# Patient Record
Sex: FEMALE | Race: WHITE | NOT HISPANIC OR LATINO | Employment: UNEMPLOYED | URBAN - NONMETROPOLITAN AREA
[De-identification: names, ages, dates, MRNs, and addresses within clinical notes are randomized per-mention and may not be internally consistent; named-entity substitution may affect disease eponyms.]

---

## 2022-10-07 ENCOUNTER — TELEPHONE (OUTPATIENT)
Dept: RADIATION ONCOLOGY | Facility: HOSPITAL | Age: 72
End: 2022-10-07

## 2022-10-07 NOTE — TELEPHONE ENCOUNTER
Message left for patient to schedule Radiation Therapy consult, number left for patient to return call.

## 2022-10-10 ENCOUNTER — TELEPHONE (OUTPATIENT)
Dept: RADIATION ONCOLOGY | Facility: HOSPITAL | Age: 72
End: 2022-10-10

## 2022-10-10 NOTE — TELEPHONE ENCOUNTER
"Patient returned phone call to set up consult appointment.  'Elham\", agreed to come for consult with, Dr. Pacheco: October 19th, Wednesday @ 1:30pm.     "

## 2022-10-18 RX ORDER — DEXAMETHASONE 4 MG/1
4 TABLET ORAL
COMMUNITY

## 2022-10-18 RX ORDER — LOSARTAN POTASSIUM 25 MG/1
1 TABLET ORAL DAILY
COMMUNITY
Start: 2022-03-30

## 2022-10-18 RX ORDER — NYSTATIN 10B UNIT
POWDER (EA) MISCELLANEOUS
COMMUNITY

## 2022-10-18 RX ORDER — METRONIDAZOLE 7.5 MG/G
GEL VAGINAL
COMMUNITY
Start: 2022-01-13

## 2022-10-18 RX ORDER — HYDROCODONE BITARTRATE AND ACETAMINOPHEN 10; 325 MG/1; MG/1
1-2 TABLET ORAL
COMMUNITY
Start: 2022-09-23

## 2022-10-18 RX ORDER — PROCHLORPERAZINE MALEATE 10 MG
10 TABLET ORAL EVERY 6 HOURS PRN
COMMUNITY

## 2022-10-18 RX ORDER — DOFETILIDE 0.25 MG/1
1 CAPSULE ORAL EVERY 12 HOURS
COMMUNITY
Start: 2022-09-06

## 2022-10-19 ENCOUNTER — ONCOLOGY VISIT (OUTPATIENT)
Dept: RADIATION ONCOLOGY | Facility: HOSPITAL | Age: 72
End: 2022-10-19
Attending: RADIOLOGY
Payer: COMMERCIAL

## 2022-10-19 VITALS
WEIGHT: 264.1 LBS | HEIGHT: 63 IN | DIASTOLIC BLOOD PRESSURE: 84 MMHG | BODY MASS INDEX: 46.79 KG/M2 | OXYGEN SATURATION: 97 % | SYSTOLIC BLOOD PRESSURE: 128 MMHG | HEART RATE: 65 BPM | TEMPERATURE: 98 F | RESPIRATION RATE: 24 BRPM

## 2022-10-19 DIAGNOSIS — Z17.0 MALIGNANT NEOPLASM OF LOWER-OUTER QUADRANT OF LEFT BREAST OF FEMALE, ESTROGEN RECEPTOR POSITIVE (H): Primary | ICD-10-CM

## 2022-10-19 DIAGNOSIS — C50.512 MALIGNANT NEOPLASM OF LOWER-OUTER QUADRANT OF LEFT BREAST OF FEMALE, ESTROGEN RECEPTOR POSITIVE (H): Primary | ICD-10-CM

## 2022-10-19 PROCEDURE — 99205 OFFICE O/P NEW HI 60 MIN: CPT | Performed by: RADIOLOGY

## 2022-10-19 PROCEDURE — G0463 HOSPITAL OUTPT CLINIC VISIT: HCPCS

## 2022-10-19 ASSESSMENT — ENCOUNTER SYMPTOMS
CONSTIPATION: 0
BACK PAIN: 1
VOMITING: 0
CHILLS: 0
NAUSEA: 0
DIARRHEA: 0
PALPITATIONS: 0
COUGH: 0
FEVER: 0
WEIGHT LOSS: 0
ABDOMINAL PAIN: 0
INSOMNIA: 0
SHORTNESS OF BREATH: 0

## 2022-10-19 ASSESSMENT — PATIENT HEALTH QUESTIONNAIRE - PHQ9: SUM OF ALL RESPONSES TO PHQ QUESTIONS 1-9: 0

## 2022-10-19 ASSESSMENT — PAIN SCALES - GENERAL: PAINLEVEL: NO PAIN (0)

## 2022-10-19 NOTE — PROGRESS NOTES
[facility]    RADIATION ONCOLOGY CONSULTATION      PRIMARY PHYSICIAN: Lakia Galindo    Diagnosis:  Malignant neoplasm of lower outer quadrant of left female breast.       Cancer Staging   Malignant neoplasm of lower-outer quadrant of left female breast (H)  Staging form: Breast, AJCC 8th Edition  - Pathologic stage from 10/19/2022: Stage IA (pT1c, pN0, cM0, G1, ER+, ND+, HER2-, Oncotype DX score: 26) - Signed by Celina Pacheco MD on 10/19/2022      IMPRESSION: 72 year old woman with screen detected left lower outer quadrant grade 1 IDC, ER/ND+, H2N- (US bx 8/18/2022).  9/8/2022: left breast lumpectomy/SLNB.  Pathology: 1.4 cm grade 1 IDC associated with non-extensive grade II DCIS, no LVI, margins negative: 8 mm IDC inferior, 4 mm DCIS superior, 0/5 SLN positive.  Oncotype DX: 26.      PLAN: Radiation therapy to the left breast to a total dose of 4256 cGy in 16 fractions of 266 cGy per fraction with a boost to the tumor bed to an additional 1000 cGy in 4 fractions of 250 cGy per fraction.  Radiation therapy will be delivered after the completion of chemotherapy with Docetaxel/Cytoxan every 3 weeks for 4 cycles.  Radiation would begin approximately 30 days from her last course of chemotherapy.  Consideration can also be given to APBI (3000 cGy in 5 fractions every other day) if the lumpectomy cavity was demarcated by surgical clips.    She will return for follow-up evaluation after completion of planned chemotherapy.    _________________________________________________________    HPI: I had the opportunity to see the patient in outpatient consultation to discuss the role of radiation therapy in the management of her left breast cancer.    The patient is a very pleasant 72-year-old female who underwent bilateral screening mammogram on 7/22/2022 that showed a new irregular shaped mass with calcifications measuring 5 x 8 mm at the 3 o'clock position of the left breast.    8/9/2022 left digital mammogram:  Lobular mass with dense calcifications noted at the 3 o'clock position 7 cm deep to the nipple    2022 left breast ultrasound: At the 4 o'clock position 7 cm from the nipple there is a 10 x 9 x 7 mm heterogenic mass area present    2022 ultrasound-guided left breast biopsy: Invasive ductal carcinoma, grade 1, ER positive (>91%), SD positive (2%), HER2 negative.      2022 left breast lumpectomy and sentinel node biopsy: Reveals 1.4 cm invasive ductal carcinoma, [place] grade 1, DCIS present, closest margin to invasive carcinoma is 0.8 cm, closest margin to DCIS is 0.4 cm.  Negative for lymphovascular invasion.  0/5 sentinel lymph nodes negative for carcinoma. Oncotype recurrence score is 26.     10/17/2022 Medical oncology visit: Discussion involved Oncotype score of 26 being an intermediate risk, plan is for Docetaxel and Cytoxan every 3 weeks for 4 cycles with Neulasta.     Pacemaker: no  History of Radiation Therapy: no  History of autoimmune disorders: no       Past Medical History:   Diagnosis Date     Abdominal hernia      Bradycardia 10/23/2013     Breast cancer, stage 1, estrogen receptor positive, left (H) 2022     Chronic bilateral low back pain without sciatica 2016     Chronic, continuous use of opioids 2018     Endometrial cancer (H)      Endometrial cancer (H)      Hypertension      Muscle weakness 2022     Osteoarthritis of knees, bilateral 2010     Persistent atrial fibrillation (H) 2019     Pre-diabetes 2020     Rosacea      Stiffness of joint, shoulder region, left 2022       Past Surgical History:   Procedure Laterality Date     APPENDECTOMY  1967     BIOPSY OF BREAST, NEEDLE CORE      ,      Breast reduction surgery Bilateral       SECTION  1988     GASTRIC BYPASS  1986    Cholecystectomy     HERNIA REPAIR  1990     HERNIA REPAIR  2008    Mesh     HERNIA REPAIR  2009     HERNIA  REPAIR, UMBILICAL  01/01/1983     HERNIORRHAPHY VENTRAL  02/01/2013    Procedure: HERNIORRHAPHY VENTRAL;;  Surgeon: Nick Perez MD;  Location: [facility] OR     HYSTERECTOMY TOTAL ABDOMINAL, BILATERAL SALPINGO-OOPHORECTOMY, NODE DISSECTION, COMBINED  02/01/2013    Procedure: COMBINED HYSTERECTOMY TOTAL ABDOMINAL, SALPINGO-OOPHORECTOMY, NODE DISSECTION;  Open Total Abdominal Hysterectomy, Bilateral Salpingo-Oophorectomy, Pelvic Washings, Extensive Lysis of Adhesions, Repair of Recurrent Incisional Hernia Anesthesia general with block;  Surgeon: Matthew Rios MD;  Location: [facility] OR     JOINT REPLACEMENT  01/01/2010     JOINT REPLACEMENT  01/01/2010     LUMPECTOMY BREAST Left 09/08/2022    Dr. Go     SURGICAL PATHOLOGY EXAM Left 09/08/2022    Left [place] Lymph Node Biopsy     TONSILLECTOMY  01/01/1958       Family History   Problem Relation Age of Onset     Dementia Mother        Social History     Tobacco Use     Smoking status: Never     Smokeless tobacco: Never   Substance Use Topics     Alcohol use: No       CURRENT MEDICATIONS:   Current Outpatient Medications   Medication     apixaban ANTICOAGULANT (ELIQUIS) 5 MG tablet     ascorbic acid (VITAMIN C) 1000 MG TABS     B COMPLEX-ZINC PO     CALCIUM CITRATE PO     Cholecalciferol (VITAMIN D3)     CRANBERRY PO     dofetilide (TIKOSYN) 250 MCG capsule     HYDROcodone-acetaminophen (NORCO)  MG per tablet     losartan (COZAAR) 25 MG tablet     Magnesium 400 MG CAPS     Multiple Vitamin (DAILY MULTIVITAMIN PO)     nystatin POWD     prochlorperazine (COMPAZINE) 10 MG tablet     atenolol-chlorthalidone (TENORETIC) 100-25 MG per tablet     dexamethasone (DECADRON) 4 MG tablet     ferrous sulfate 325 (65 FE) MG tablet     Flaxseed, Linseed, 1000 MG CAPS     metroNIDAZOLE (METROGEL) 0.75 % vaginal gel     senna-docusate (SENOKOT-S;PERICOLACE) 8.6-50 MG per tablet     sulfamethoxazole-trimethoprim (BACTRIM DS,SEPTRA DS) 800-160 MG per tablet     zolpidem (AMBIEN) 10  "MG tablet     No current facility-administered medications for this visit.         ALLERGIES:  Allergies   Allergen Reactions     Codeine Sulfate      Contrast Dye      Diagnostic X-Ray Materials Itching and Other (See Comments)     Itching in mouth     Dilaudid Cough      Dilaudid - small amounts ok otherwise stopped breathing       No Clinical Screening - See Comments Other (See Comments)     Morphine and Related: Blood pressure elevates     Vancomycin      Noted on H&P       Review of Systems   Constitutional: Negative for chills, fever, malaise/fatigue and weight loss.   Respiratory: Negative for cough and shortness of breath.    Cardiovascular: Negative for chest pain and palpitations.   Gastrointestinal: Negative for abdominal pain, constipation, diarrhea, nausea and vomiting.   Musculoskeletal: Positive for back pain (chronic).   Psychiatric/Behavioral: The patient does not have insomnia.        VITAL SIGNS:  /84 (BP Location: Left arm, Patient Position: Chair, Cuff Size: Adult Regular)   Pulse 65   Temp 98  F (36.7  C) (Tympanic)   Resp 24   Ht 1.6 m (5' 3\")   Wt 119.8 kg (264 lb 1.6 oz)   SpO2 97%   BMI 46.78 kg/m    Wt Readings from Last 4 Encounters:   10/19/22 119.8 kg (264 lb 1.6 oz)   03/22/13 109 kg (240 lb 3.2 oz)   03/09/13 111.6 kg (246 lb 1.6 oz)   03/08/13 113.4 kg (250 lb)       PHYSICAL EXAM:  Constitutional: Alert pleasant female in no distress.  Breasts: bilaterally large and pendulous.  Right breast is unremarkable.  Left breast: healed upper outer circumareolar incision.  Lumpectomy site palpable at 3:00.  Puckering of the skin over the lumpectomy site/seroma.  No erythema or edema of the breast.  No nipple discharge.  Nodes:  No palpable adenopathy left axilla/supraclavicular  Skin:  without unusual rashes or lesions in the proposed treatment area.  Musculoskeletal: Range of motion LUE excellent: no edema  Psychiatric: Oriented to time place and person with recent and remote " memory intact and with normal affect.  Judgment and insight are sufficient.    PILY Choe CNP    Celina Pacheco MD    Time spent in interaction with the patient, review of medical records, documentation, care coordination, review of imaging and physical examination: 75 minutes    Note to patient: Medical notes are made available to patients in the interest of transparency.  Be advised that this is a medical document and intended as a peer to peer communication.  It is written using medical language and may contain abbreviations or verbiage that is unfamiliar.  It may appear blunt or direct.  Medical documentation is intended to carry relevant information, fax is evident in the clinical opinion of the practitioner.  Further clarification can be obtained by scheduling a follow-up evaluation with the practitioner.

## 2022-10-19 NOTE — PROGRESS NOTES
"Radiation Oncology Rooming Note    October 19, 2022 2:17 PM   Virgen Corrales is a 72 year old female who presents for:    Chief Complaint   Patient presents with     Consult     Radiation Oncology Consultation     Initial Vitals: /84 (BP Location: Left arm, Patient Position: Chair, Cuff Size: Adult Regular)   Pulse 65   Temp 98  F (36.7  C) (Tympanic)   Resp 24   Ht 1.6 m (5' 3\")   Wt 119.8 kg (264 lb 1.6 oz)   SpO2 97%   BMI 46.78 kg/m   Estimated body mass index is 46.78 kg/m  as calculated from the following:    Height as of this encounter: 1.6 m (5' 3\").    Weight as of this encounter: 119.8 kg (264 lb 1.6 oz). Body surface area is 2.31 meters squared.  No Pain (0) Comment: Data Unavailable   No LMP recorded. Patient has had a hysterectomy.  Allergies reviewed: Yes  Medications reviewed: Yes      Patient was assessed using the NCCN psychosocial distress thermometer. Patient rated the score as a zero. Patient rated current stressors as no stressors. Stressors will be brought to the attention of provider or Oncology RN Care Coordinator for a score of 6 or greater or per nurses discretion.     Patient received folder containing bill of rights, advance directives information/application, radiation therapy site specific informational pamphlet, radiation therapy web sites for patient research hand out, financial letter, vaccines during cancer treatment hand out, mental health services and providers packet, radiation therapy business card,  business card, skin care during radiation hand out, starting radiation handout, care partners,  and [employer] application.      Sherry Covington LPN    "

## 2023-01-19 ENCOUNTER — TELEPHONE (OUTPATIENT)
Dept: RADIATION ONCOLOGY | Facility: HOSPITAL | Age: 73
End: 2023-01-19
Payer: COMMERCIAL

## 2023-01-19 NOTE — TELEPHONE ENCOUNTER
Left message with Madison at [facility] to have Dr. De Luna's nurse call us back to let us know if Elham can start XRT.  Final cycle of chemotherapy was completed on 1/4/23.  Awaiting call back.    1400- received a call back from  reporting that the patient can start XRT.  Patient will be scheduled for a simulation.     April Morataya RN

## 2023-01-30 NOTE — PROGRESS NOTES
Oncology Follow-up Visit:  January 30, 2023    Diagnosis:   Malignant neoplasm of lower-outer quadrant of left breast of female, estrogen receptor positive (H)    History Of Present Illness:  Ms. Corrales is a 72 year old female is here for follow-up post chemotherapy (completed 1/4/2023), here for routine follow up for consent and simulation. ***    Review Of Systems:  There were no vitals taken for this visit.    Past medical, social, surgical, and family histories reviewed.    Allergies:  Allergies as of 02/02/2023 - Reviewed 10/19/2022   Allergen Reaction Noted     Codeine sulfate  01/16/2013     Contrast dye  01/16/2013     Diagnostic x-ray materials Itching and Other (See Comments) 05/28/2009     Dilaudid cough  01/16/2013     No clinical screening - see comments Other (See Comments) 04/23/2008     Vancomycin  01/29/2013       Current Medications:  Current Outpatient Medications   Medication Sig Dispense Refill     apixaban ANTICOAGULANT (ELIQUIS) 5 MG tablet Take 1 tablet by mouth 2 times daily       ascorbic acid (VITAMIN C) 1000 MG TABS Take 1 tablet by mouth daily.       atenolol-chlorthalidone (TENORETIC) 100-25 MG per tablet Take 1 tablet by mouth daily. (Patient not taking: Reported on 10/19/2022)       B COMPLEX-ZINC PO Take 1 tablet by mouth daily.       CALCIUM CITRATE PO Take 250 mg by mouth 2 times daily       Cholecalciferol (VITAMIN D3) 1,000 Units daily       CRANBERRY PO        dexamethasone (DECADRON) 4 MG tablet Take 4 mg by mouth Take 8 mg(2 tabs) twice daily for 3 days beginning the day prior to chemotherapy for each cycle. (Patient not taking: Reported on 10/19/2022)       dofetilide (TIKOSYN) 250 MCG capsule Take 1 capsule by mouth every 12 hours       ferrous sulfate 325 (65 FE) MG tablet Take 325 mg by mouth daily (with breakfast). (Patient not taking: Reported on 10/19/2022)       Flaxseed, Linseed, 1000 MG CAPS 1 capsule daily. (Patient not taking: Reported on 10/19/2022)        "HYDROcodone-acetaminophen (NORCO)  MG per tablet Take 1-2 tablets by mouth Taking 1/2 tab every 6 hours. Takes for back pain. Also wears binders.       losartan (COZAAR) 25 MG tablet Take 1 tablet by mouth daily       Magnesium 400 MG CAPS Take 400 mg by mouth       metroNIDAZOLE (METROGEL) 0.75 % vaginal gel Apply to affected area 2 times daily as directed (Patient not taking: Reported on 10/19/2022)       Multiple Vitamin (DAILY MULTIVITAMIN PO) Take 1 tablet by mouth daily.       nystatin POWD As needed       prochlorperazine (COMPAZINE) 10 MG tablet Take 10 mg by mouth every 6 hours as needed for nausea or vomiting       senna-docusate (SENOKOT-S;PERICOLACE) 8.6-50 MG per tablet Take 1-2 tablets by mouth 2 times daily. (Patient not taking: Reported on 10/19/2022) 60 tablet 0     sulfamethoxazole-trimethoprim (BACTRIM DS,SEPTRA DS) 800-160 MG per tablet Take 1 tablet by mouth 2 times daily. (Patient not taking: Reported on 10/19/2022) 14 tablet 0     zolpidem (AMBIEN) 10 MG tablet Take 1 tablet by mouth daily. (Patient not taking: Reported on 10/19/2022)          Physical Exam:  There were no vitals taken for this visit.  {EXAM COMPLETE FEMALE:320997::\"  GENERAL APPEARANCE: healthy, alert and no distress\",\"   HENT: Mouth without ulcers or lesions\",\"   NECK: no adenopathy, no asymmetry or masses\",\"   LYMPHATICS: No cervical, supraclavicular, axillary or inguinal lymphadenopathy\",\"   RESP: lungs clear to auscultation - no rales, rhonchi or wheezes\",\"   CARDIOVASCULAR: regular rates and rhythm, normal S1 S2, no S3 or S4 and no murmur.\",\"   ABDOMEN:  soft, nontender, no HSM or masses and bowel sounds normal\",\"   MUSCULOSKELETAL: extremities normal- no gross deformities noted, no evidence of inflammation in joints, FROM in all extremities. No edema b/l LE.\",\"   SKIN: no suspicious lesions or rashes\",\"   PSYCHIATRIC: mentation appears normal and affect normal\"}    Laboratory/Imaging Studies  No visits with " results within 2 Week(s) from this visit.   Latest known visit with results is:   Office Visit on 01/31/2013   Component Date Value Ref Range Status     INR 01/31/2013 1.04  0.86 - 1.14 Final     ABO 01/31/2013 O   Final     RH(D) 01/31/2013  Pos   Final     Antibody Screen 01/31/2013 Neg   Final     Specimen Expires 01/31/2013 02/03/2013   Final        ASSESSMENT/PLAN:

## 2023-02-02 ENCOUNTER — ONCOLOGY VISIT (OUTPATIENT)
Dept: RADIATION ONCOLOGY | Facility: HOSPITAL | Age: 73
End: 2023-02-02
Attending: FAMILY MEDICINE
Payer: COMMERCIAL

## 2023-02-02 ENCOUNTER — ALLIED HEALTH/NURSE VISIT (OUTPATIENT)
Dept: RADIATION ONCOLOGY | Facility: HOSPITAL | Age: 73
End: 2023-02-02
Payer: COMMERCIAL

## 2023-02-02 VITALS
OXYGEN SATURATION: 99 % | TEMPERATURE: 98.2 F | SYSTOLIC BLOOD PRESSURE: 124 MMHG | HEART RATE: 87 BPM | RESPIRATION RATE: 21 BRPM | DIASTOLIC BLOOD PRESSURE: 84 MMHG

## 2023-02-02 DIAGNOSIS — C50.512 MALIGNANT NEOPLASM OF LOWER-OUTER QUADRANT OF LEFT BREAST OF FEMALE, ESTROGEN RECEPTOR POSITIVE (H): Primary | ICD-10-CM

## 2023-02-02 DIAGNOSIS — Z17.0 MALIGNANT NEOPLASM OF LOWER-OUTER QUADRANT OF LEFT BREAST OF FEMALE, ESTROGEN RECEPTOR POSITIVE (H): Primary | ICD-10-CM

## 2023-02-02 PROCEDURE — 77290 THER RAD SIMULAJ FIELD CPLX: CPT | Mod: 26 | Performed by: RADIOLOGY

## 2023-02-02 PROCEDURE — 77290 THER RAD SIMULAJ FIELD CPLX: CPT | Performed by: RADIOLOGY

## 2023-02-02 ASSESSMENT — PAIN SCALES - GENERAL: PAINLEVEL: NO PAIN (0)

## 2023-02-02 NOTE — PROGRESS NOTES
[facility]  DEPARTMENT OF RADIATION ONCOLOGY   SIMULATION NOTE    Name: Virgen Corrales MRN: [id number]   : 1950 (72 year old)  Date of Service: 2023        Diagnosis and Cancer Staging   Malignant neoplasm of lower-outer quadrant of left female breast (H)  Staging form: Breast, AJCC 8th Edition  - Pathologic stage from 10/19/2022: Stage IA (pT1c, pN0, cM0, G1, ER+, OR+, HER2-, Oncotype DX score: 26) - Signed by Celina Pacheco MD on 10/19/2022       Procedure   For non-SBRT treatment, the patient comes to clinic for simulation and radiation therapy for treatment as specified on the written consent (site of treatment, type of cancer). Therapy, Treatment Planning, and I reviewed the anticipated radiation therapy, clinical history and documentation, and radiographic information and images. We obtained written consent for treatment. With the patient, we verified their identification, site, and side of treatment. We evaluated multiple setup positions and elected to simulate the patient in a modified supine position. We used orthogonal lasers to align them with the CT simulator. We immobilized the patient with a customized torso mold and accessories to improve the reproducibility and safety for daily radiation therapy. We placed radiopaque markers to assist in identifying topographical landmarks for simulation. We obtained  and axial CT imaging through the target region. We used virtual simulation techniques to verify the adequacy of the CT images and to create a preliminary setup isocenter. Motion management of respiratory and cardiac motion utilized 4D-CT and/or deep inspiration breath hold (DIBH) through the anticipated field of radiation therapy. We placed tattoos to vahe the setup isocenter. The patient tolerated the procedure well and without complications. We will use available diagnostic and radiation therapy imaging studies for CT-based treatment planning with image fusion  as indicated. I anticipate utilizing a form of intensity-modulated or 3D-conformal radiation therapy to develop a computerized treatment plan whose dosimetric analysis (e.g., dose-volume histogram (DVH)) indicates adequate coverage of target tissues and sparing of nearby normal structures. We will complete routine QA procedures. The patient wished to proceed as recommended. We answered all questions to her satisfaction.    Implanted Cardiac Devices: None.    PILY Toledo CNP   Department of Radiation Oncology  Tel: [phone]  Fax: [phone]

## 2023-02-02 NOTE — PROGRESS NOTES
Patient was simulated today.    Ko CHING(RESHMA.)  Department of Radiation Oncology  Phone: [phone]

## 2023-02-17 ENCOUNTER — TELEPHONE (OUTPATIENT)
Dept: RADIATION ONCOLOGY | Facility: HOSPITAL | Age: 73
End: 2023-02-17

## 2023-02-21 PROCEDURE — 77295 3-D RADIOTHERAPY PLAN: CPT | Mod: 26 | Performed by: STUDENT IN AN ORGANIZED HEALTH CARE EDUCATION/TRAINING PROGRAM

## 2023-02-21 PROCEDURE — 77295 3-D RADIOTHERAPY PLAN: CPT | Performed by: STUDENT IN AN ORGANIZED HEALTH CARE EDUCATION/TRAINING PROGRAM

## 2023-02-21 PROCEDURE — 77300 RADIATION THERAPY DOSE PLAN: CPT | Mod: 26 | Performed by: STUDENT IN AN ORGANIZED HEALTH CARE EDUCATION/TRAINING PROGRAM

## 2023-02-21 PROCEDURE — 77334 RADIATION TREATMENT AID(S): CPT | Performed by: RADIOLOGY

## 2023-02-21 PROCEDURE — 77334 RADIATION TREATMENT AID(S): CPT | Mod: 26 | Performed by: STUDENT IN AN ORGANIZED HEALTH CARE EDUCATION/TRAINING PROGRAM

## 2023-02-21 PROCEDURE — 77300 RADIATION THERAPY DOSE PLAN: CPT | Performed by: RADIOLOGY

## 2023-02-27 ENCOUNTER — RESULTS ONLY (OUTPATIENT)
Dept: RADIATION ONCOLOGY | Facility: HOSPITAL | Age: 73
End: 2023-02-27

## 2023-02-27 ENCOUNTER — APPOINTMENT (OUTPATIENT)
Dept: RADIATION ONCOLOGY | Facility: HOSPITAL | Age: 73
End: 2023-02-27
Payer: COMMERCIAL

## 2023-02-27 LAB
RAD ONC ARIA COURSE ID: NORMAL
RAD ONC ARIA COURSE LAST TREATMENT DATE: NORMAL
RAD ONC ARIA COURSE START DATE: NORMAL
RAD ONC ARIA COURSE TREATMENT ELAPSED DAYS: 0
RAD ONC ARIA FIRST TREATMENT DATE: NORMAL
RAD ONC ARIA PLAN FRACTIONS TREATED TO DATE: 1
RAD ONC ARIA PLAN ID: NORMAL
RAD ONC ARIA PLAN PRESCRIBED DOSE PER FRACTION: 6 GY
RAD ONC ARIA PLAN TOTAL FRACTIONS PRESCRIBED: 5
RAD ONC ARIA PLAN TOTAL PRESCRIBED DOSE: 3000 CGY
RAD ONC ARIA REFERENCE POINT DOSAGE GIVEN TO DATE: NORMAL GY
RAD ONC ARIA REFERENCE POINT DOSAGE GIVEN TO DATE: NORMAL GY
RAD ONC ARIA REFERENCE POINT ID: NORMAL
RAD ONC ARIA REFERENCE POINT ID: NORMAL

## 2023-02-27 PROCEDURE — 77387 GUIDANCE FOR RADJ TX DLVR: CPT

## 2023-02-27 PROCEDURE — 77014 PR CT GUIDE FOR PLACEMENT RADIATION THERAPY FIELDS: CPT | Mod: 26 | Performed by: RADIOLOGY

## 2023-02-27 PROCEDURE — 77412 RADIATION TX DELIVERY LVL 3: CPT | Performed by: RADIOLOGY

## 2023-02-27 PROCEDURE — 77014 HC CT GUIDE FOR PLACEMENT RADIATION THERAPY FIELDS: CPT | Performed by: RADIOLOGY

## 2023-02-28 ENCOUNTER — APPOINTMENT (OUTPATIENT)
Dept: RADIATION ONCOLOGY | Facility: HOSPITAL | Age: 73
End: 2023-02-28
Payer: COMMERCIAL

## 2023-02-28 ENCOUNTER — RESULTS ONLY (OUTPATIENT)
Dept: RADIATION ONCOLOGY | Facility: HOSPITAL | Age: 73
End: 2023-02-28

## 2023-02-28 LAB
RAD ONC ARIA COURSE ID: NORMAL
RAD ONC ARIA COURSE LAST TREATMENT DATE: NORMAL
RAD ONC ARIA COURSE START DATE: NORMAL
RAD ONC ARIA COURSE TREATMENT ELAPSED DAYS: 1
RAD ONC ARIA FIRST TREATMENT DATE: NORMAL
RAD ONC ARIA PLAN FRACTIONS TREATED TO DATE: 2
RAD ONC ARIA PLAN ID: NORMAL
RAD ONC ARIA PLAN PRESCRIBED DOSE PER FRACTION: 6 GY
RAD ONC ARIA PLAN TOTAL FRACTIONS PRESCRIBED: 5
RAD ONC ARIA PLAN TOTAL PRESCRIBED DOSE: 3000 CGY
RAD ONC ARIA REFERENCE POINT DOSAGE GIVEN TO DATE: NORMAL GY
RAD ONC ARIA REFERENCE POINT DOSAGE GIVEN TO DATE: NORMAL GY
RAD ONC ARIA REFERENCE POINT ID: NORMAL
RAD ONC ARIA REFERENCE POINT ID: NORMAL

## 2023-02-28 PROCEDURE — 77014 HC CT GUIDE FOR PLACEMENT RADIATION THERAPY FIELDS: CPT | Performed by: RADIOLOGY

## 2023-02-28 PROCEDURE — 77387 GUIDANCE FOR RADJ TX DLVR: CPT

## 2023-02-28 PROCEDURE — 77014 PR CT GUIDE FOR PLACEMENT RADIATION THERAPY FIELDS: CPT | Mod: 26 | Performed by: RADIOLOGY

## 2023-02-28 PROCEDURE — 77412 RADIATION TX DELIVERY LVL 3: CPT | Performed by: RADIOLOGY

## 2023-03-01 ENCOUNTER — APPOINTMENT (OUTPATIENT)
Dept: RADIATION ONCOLOGY | Facility: HOSPITAL | Age: 73
End: 2023-03-01
Payer: COMMERCIAL

## 2023-03-01 ENCOUNTER — RESULTS ONLY (OUTPATIENT)
Dept: RADIATION ONCOLOGY | Facility: HOSPITAL | Age: 73
End: 2023-03-01

## 2023-03-01 LAB
RAD ONC ARIA COURSE ID: NORMAL
RAD ONC ARIA COURSE LAST TREATMENT DATE: NORMAL
RAD ONC ARIA COURSE START DATE: NORMAL
RAD ONC ARIA COURSE TREATMENT ELAPSED DAYS: 2
RAD ONC ARIA FIRST TREATMENT DATE: NORMAL
RAD ONC ARIA PLAN FRACTIONS TREATED TO DATE: 3
RAD ONC ARIA PLAN ID: NORMAL
RAD ONC ARIA PLAN PRESCRIBED DOSE PER FRACTION: 6 GY
RAD ONC ARIA PLAN TOTAL FRACTIONS PRESCRIBED: 5
RAD ONC ARIA PLAN TOTAL PRESCRIBED DOSE: 3000 CGY
RAD ONC ARIA REFERENCE POINT DOSAGE GIVEN TO DATE: NORMAL GY
RAD ONC ARIA REFERENCE POINT DOSAGE GIVEN TO DATE: NORMAL GY
RAD ONC ARIA REFERENCE POINT ID: NORMAL
RAD ONC ARIA REFERENCE POINT ID: NORMAL

## 2023-03-01 PROCEDURE — 77412 RADIATION TX DELIVERY LVL 3: CPT | Performed by: RADIOLOGY

## 2023-03-01 PROCEDURE — 77014 PR CT GUIDE FOR PLACEMENT RADIATION THERAPY FIELDS: CPT | Mod: 26 | Performed by: RADIOLOGY

## 2023-03-01 PROCEDURE — 77387 GUIDANCE FOR RADJ TX DLVR: CPT

## 2023-03-01 PROCEDURE — 77014 HC CT GUIDE FOR PLACEMENT RADIATION THERAPY FIELDS: CPT | Performed by: RADIOLOGY

## 2023-03-02 ENCOUNTER — APPOINTMENT (OUTPATIENT)
Dept: RADIATION ONCOLOGY | Facility: HOSPITAL | Age: 73
End: 2023-03-02
Payer: COMMERCIAL

## 2023-03-02 ENCOUNTER — RESULTS ONLY (OUTPATIENT)
Dept: RADIATION ONCOLOGY | Facility: HOSPITAL | Age: 73
End: 2023-03-02

## 2023-03-02 LAB
RAD ONC ARIA COURSE ID: NORMAL
RAD ONC ARIA COURSE LAST TREATMENT DATE: NORMAL
RAD ONC ARIA COURSE START DATE: NORMAL
RAD ONC ARIA COURSE TREATMENT ELAPSED DAYS: 3
RAD ONC ARIA FIRST TREATMENT DATE: NORMAL
RAD ONC ARIA PLAN FRACTIONS TREATED TO DATE: 4
RAD ONC ARIA PLAN ID: NORMAL
RAD ONC ARIA PLAN PRESCRIBED DOSE PER FRACTION: 6 GY
RAD ONC ARIA PLAN TOTAL FRACTIONS PRESCRIBED: 5
RAD ONC ARIA PLAN TOTAL PRESCRIBED DOSE: 3000 CGY
RAD ONC ARIA REFERENCE POINT DOSAGE GIVEN TO DATE: NORMAL GY
RAD ONC ARIA REFERENCE POINT DOSAGE GIVEN TO DATE: NORMAL GY
RAD ONC ARIA REFERENCE POINT ID: NORMAL
RAD ONC ARIA REFERENCE POINT ID: NORMAL

## 2023-03-02 PROCEDURE — 77014 HC CT GUIDE FOR PLACEMENT RADIATION THERAPY FIELDS: CPT | Performed by: RADIOLOGY

## 2023-03-02 PROCEDURE — 77014 PR CT GUIDE FOR PLACEMENT RADIATION THERAPY FIELDS: CPT | Mod: 26 | Performed by: RADIOLOGY

## 2023-03-02 PROCEDURE — 77412 RADIATION TX DELIVERY LVL 3: CPT | Performed by: RADIOLOGY

## 2023-03-02 PROCEDURE — 77387 GUIDANCE FOR RADJ TX DLVR: CPT

## 2023-03-03 ENCOUNTER — RESULTS ONLY (OUTPATIENT)
Dept: RADIATION ONCOLOGY | Facility: HOSPITAL | Age: 73
End: 2023-03-03

## 2023-03-03 ENCOUNTER — ALLIED HEALTH/NURSE VISIT (OUTPATIENT)
Dept: RADIATION ONCOLOGY | Facility: HOSPITAL | Age: 73
End: 2023-03-03

## 2023-03-03 ENCOUNTER — OFFICE VISIT (OUTPATIENT)
Dept: RADIATION ONCOLOGY | Facility: HOSPITAL | Age: 73
End: 2023-03-03
Payer: COMMERCIAL

## 2023-03-03 VITALS
RESPIRATION RATE: 16 BRPM | HEART RATE: 62 BPM | WEIGHT: 258.5 LBS | OXYGEN SATURATION: 97 % | SYSTOLIC BLOOD PRESSURE: 124 MMHG | TEMPERATURE: 97.5 F | DIASTOLIC BLOOD PRESSURE: 62 MMHG | BODY MASS INDEX: 45.79 KG/M2

## 2023-03-03 DIAGNOSIS — C50.512 MALIGNANT NEOPLASM OF LOWER-OUTER QUADRANT OF LEFT BREAST OF FEMALE, ESTROGEN RECEPTOR POSITIVE (H): Primary | ICD-10-CM

## 2023-03-03 DIAGNOSIS — Z17.0 MALIGNANT NEOPLASM OF LOWER-OUTER QUADRANT OF LEFT BREAST OF FEMALE, ESTROGEN RECEPTOR POSITIVE (H): Primary | ICD-10-CM

## 2023-03-03 LAB
RAD ONC ARIA COURSE ID: NORMAL
RAD ONC ARIA COURSE LAST TREATMENT DATE: NORMAL
RAD ONC ARIA COURSE START DATE: NORMAL
RAD ONC ARIA COURSE TREATMENT ELAPSED DAYS: 4
RAD ONC ARIA FIRST TREATMENT DATE: NORMAL
RAD ONC ARIA PLAN FRACTIONS TREATED TO DATE: 5
RAD ONC ARIA PLAN ID: NORMAL
RAD ONC ARIA PLAN PRESCRIBED DOSE PER FRACTION: 6 GY
RAD ONC ARIA PLAN TOTAL FRACTIONS PRESCRIBED: 5
RAD ONC ARIA PLAN TOTAL PRESCRIBED DOSE: 3000 CGY
RAD ONC ARIA REFERENCE POINT DOSAGE GIVEN TO DATE: NORMAL GY
RAD ONC ARIA REFERENCE POINT DOSAGE GIVEN TO DATE: NORMAL GY
RAD ONC ARIA REFERENCE POINT ID: NORMAL
RAD ONC ARIA REFERENCE POINT ID: NORMAL

## 2023-03-03 PROCEDURE — 77412 RADIATION TX DELIVERY LVL 3: CPT | Performed by: RADIOLOGY

## 2023-03-03 PROCEDURE — 77336 RADIATION PHYSICS CONSULT: CPT | Performed by: RADIOLOGY

## 2023-03-03 PROCEDURE — 77014 PR CT GUIDE FOR PLACEMENT RADIATION THERAPY FIELDS: CPT | Mod: 26 | Performed by: RADIOLOGY

## 2023-03-03 PROCEDURE — 77014 HC CT GUIDE FOR PLACEMENT RADIATION THERAPY FIELDS: CPT | Performed by: RADIOLOGY

## 2023-03-03 PROCEDURE — 77427 RADIATION TX MANAGEMENT X5: CPT | Performed by: RADIOLOGY

## 2023-03-03 ASSESSMENT — PAIN SCALES - GENERAL: PAINLEVEL: NO PAIN (0)

## 2023-03-03 NOTE — PROGRESS NOTES
Virgen Corrales  Gender: female  : 1950  Medical Record: [id number]  Primary Care Provider: Lakia Galindo    REFERRING PHYSICIANS: Dr. Julisa Meyer Dr. Friday     DIAGNOSIS: Malignant neoplasm of lower-outer quadrant of left breast of female, estrogen receptor positive (H)    TREATMENT INTENT: Curative   AREA TREATED: Left breast   PRIMARY TREATMENT TECHNIQUE: 3d conformal  ENERGY: 06X, 10X  TUMOR DOSE: 3000 cGy  NUMBER OF TREATMENTS: 5    BOOST AREA TREATED: N/A      TOTAL NUMBER OF TREATMENTS: 5  TOTAL DOSE: 3000 cGy  ELAPSED CALENDAR DAYS: 4  COMPLETION DATE: 2023       Teresa Allen DNP, APRN, FNP-C   Department of Radiation Oncology

## 2023-03-03 NOTE — PATIENT INSTRUCTIONS
Discharge instructions for radiation of the breast      Continue with skin care is using CeraVe to the treatment area for at least 3 weeks.  Skin reaction will peak at 7 to 10 days after last treatment and skin in the treatment area will burn more quickly than your other skin.  Protect skin from sun using a sunscreen, reapply frequently, and keep covered during peak times in the sun.  Fatigue from radiation should gradually improve over the next several weeks.

## 2023-03-03 NOTE — PROGRESS NOTES
Progress Notes  Encounter Date: Mar 3, 2023  PILY Choe CNP     RADIATION ONCOLOGY WEEKLY MANAGEMENT PROGRESS NOTE     Patient Care Team       Relationship Specialty Notifications Start End    Dolores Galindohleen PCP - General   12/20/12     Phone: [phone]          XXX NO INFO FOUND  [address] [facility] [address]    Nick Rosales MD Assigned Cancer Care Provider   2/4/23     Phone: [phone] Fax: [phone]         [address] [facility] [id number]                  DIAGNOSIS:  Cancer Staging   Malignant neoplasm of lower-outer quadrant of left female breast (H)  Staging form: Breast, AJCC 8th Edition  - Pathologic stage from 10/19/2022: Stage IA (pT1c, pN0, cM0, G1, ER+, CA+, HER2-, Oncotype DX score: 26) - Signed by Celina Pacheco MD on 10/19/2022          RADIATION THERAPY:    Virgen Corrales has received 3000 cGy to date to left breast.   Treatment 5/5  Total planned dose: 3000 cGy      SUBJECTIVE:    Currently She  Reports feeling well. Denies questions or concerns          OBJECTIVE:    Examination reveals an alert non ill appearing female patient, respirations non labored, no skin changes.        IMPRESSION:  Routine tolerance to radiation therapy.       PLAN:  Treatment completion today. Will continue to follow with medical oncology.        Medical record and imaging reviewed by covering locum provider.      PILY Choe CNP

## 2023-03-17 ENCOUNTER — TELEPHONE (OUTPATIENT)
Dept: RADIATION ONCOLOGY | Facility: HOSPITAL | Age: 73
End: 2023-03-17
Payer: COMMERCIAL

## 2023-03-17 NOTE — TELEPHONE ENCOUNTER
Completed radiation therapy for left breast cancer 3/3/23.  Follow-up call made and spoke with patient.  She has no side effects from the radiation and never did.  She saw  Friday last week for a follow-up and will continue to see him.  She has no questions or concerns.    April Morataya RN